# Patient Record
Sex: FEMALE | Race: WHITE | Employment: FULL TIME | URBAN - METROPOLITAN AREA
[De-identification: names, ages, dates, MRNs, and addresses within clinical notes are randomized per-mention and may not be internally consistent; named-entity substitution may affect disease eponyms.]

---

## 2017-01-05 ENCOUNTER — OFFICE VISIT (OUTPATIENT)
Dept: FAMILY MEDICINE CLINIC | Age: 32
End: 2017-01-05

## 2017-01-05 ENCOUNTER — TELEPHONE (OUTPATIENT)
Dept: FAMILY MEDICINE CLINIC | Age: 32
End: 2017-01-05

## 2017-01-05 VITALS
SYSTOLIC BLOOD PRESSURE: 122 MMHG | DIASTOLIC BLOOD PRESSURE: 70 MMHG | HEART RATE: 95 BPM | TEMPERATURE: 98.4 F | BODY MASS INDEX: 26.31 KG/M2 | OXYGEN SATURATION: 97 % | WEIGHT: 168 LBS

## 2017-01-05 DIAGNOSIS — S05.01XA CORNEAL ABRASION, RIGHT, INITIAL ENCOUNTER: ICD-10-CM

## 2017-01-05 DIAGNOSIS — S05.01XA CORNEAL ABRASION, RIGHT, INITIAL ENCOUNTER: Primary | ICD-10-CM

## 2017-01-05 PROCEDURE — 99213 OFFICE O/P EST LOW 20 MIN: CPT | Performed by: NURSE PRACTITIONER

## 2017-01-05 ASSESSMENT — ENCOUNTER SYMPTOMS
EYE PAIN: 1
PHOTOPHOBIA: 1
EYE REDNESS: 1

## 2017-10-11 ENCOUNTER — TELEPHONE (OUTPATIENT)
Dept: FAMILY MEDICINE CLINIC | Age: 32
End: 2017-10-11

## 2017-10-11 NOTE — TELEPHONE ENCOUNTER
Pls  Tdap. Just fyi, I have noticed that a few things are scanned in w/o getting entered into .   Thx.

## 2017-10-30 ENCOUNTER — OFFICE VISIT (OUTPATIENT)
Dept: FAMILY MEDICINE CLINIC | Age: 32
End: 2017-10-30

## 2017-10-30 VITALS
BODY MASS INDEX: 25.33 KG/M2 | DIASTOLIC BLOOD PRESSURE: 78 MMHG | HEART RATE: 93 BPM | HEIGHT: 66 IN | WEIGHT: 157.6 LBS | SYSTOLIC BLOOD PRESSURE: 100 MMHG | OXYGEN SATURATION: 98 %

## 2017-10-30 DIAGNOSIS — Z00.00 ANNUAL PHYSICAL EXAM: Primary | ICD-10-CM

## 2017-10-30 LAB
A/G RATIO: 1.4 (ref 1.1–2.2)
ALBUMIN SERPL-MCNC: 3.9 G/DL (ref 3.4–5)
ALP BLD-CCNC: 53 U/L (ref 40–129)
ALT SERPL-CCNC: 9 U/L (ref 10–40)
ANION GAP SERPL CALCULATED.3IONS-SCNC: 12 MMOL/L (ref 3–16)
AST SERPL-CCNC: 11 U/L (ref 15–37)
BILIRUB SERPL-MCNC: 0.4 MG/DL (ref 0–1)
BUN BLDV-MCNC: 10 MG/DL (ref 7–20)
CALCIUM SERPL-MCNC: 9.1 MG/DL (ref 8.3–10.6)
CHLORIDE BLD-SCNC: 103 MMOL/L (ref 99–110)
CHOLESTEROL, TOTAL: 140 MG/DL (ref 0–199)
CO2: 24 MMOL/L (ref 21–32)
CREAT SERPL-MCNC: 0.8 MG/DL (ref 0.6–1.1)
GFR AFRICAN AMERICAN: >60
GFR NON-AFRICAN AMERICAN: >60
GLOBULIN: 2.8 G/DL
GLUCOSE BLD-MCNC: 79 MG/DL (ref 70–99)
HCT VFR BLD CALC: 37.2 % (ref 36–48)
HDLC SERPL-MCNC: 59 MG/DL (ref 40–60)
HEMOGLOBIN: 12.3 G/DL (ref 12–16)
LDL CHOLESTEROL CALCULATED: 72 MG/DL
MCH RBC QN AUTO: 32.1 PG (ref 26–34)
MCHC RBC AUTO-ENTMCNC: 33.2 G/DL (ref 31–36)
MCV RBC AUTO: 96.8 FL (ref 80–100)
PDW BLD-RTO: 14.4 % (ref 12.4–15.4)
PLATELET # BLD: 271 K/UL (ref 135–450)
PMV BLD AUTO: 8.5 FL (ref 5–10.5)
POTASSIUM SERPL-SCNC: 4.6 MMOL/L (ref 3.5–5.1)
RBC # BLD: 3.84 M/UL (ref 4–5.2)
SODIUM BLD-SCNC: 139 MMOL/L (ref 136–145)
TOTAL PROTEIN: 6.7 G/DL (ref 6.4–8.2)
TRIGL SERPL-MCNC: 46 MG/DL (ref 0–150)
VLDLC SERPL CALC-MCNC: 9 MG/DL
WBC # BLD: 5.2 K/UL (ref 4–11)

## 2017-10-30 PROCEDURE — 36415 COLL VENOUS BLD VENIPUNCTURE: CPT | Performed by: FAMILY MEDICINE

## 2017-10-30 PROCEDURE — 99395 PREV VISIT EST AGE 18-39: CPT | Performed by: FAMILY MEDICINE

## 2017-10-30 NOTE — PROGRESS NOTES
Subjective:      Patient ID: Mariano Lucas is a 33 y.o. female. HPI  Chief Complaint   Patient presents with    Annual Exam   Has been feeling well other than uri x few days, getting better with Cold Ease and Mucinex. Has lost 11 lb in last 10 mo. Goes to [employer], does small group training classes. No cp, sob, abd pain. Nl bowel and bladder fx. Nl menses. Review of Systems   All other systems reviewed and are negative. Objective:   Physical Exam  /78 (Site: Right Arm, Position: Sitting, Cuff Size: Medium Adult)   Pulse 93   Ht 5' 6\" (1.676 m)   Wt 157 lb 9.6 oz (71.5 kg)   LMP 10/05/2017 (Exact Date)   SpO2 98%   BMI 25.44 kg/m²   HEENT nl, Neck supple, no lad or tmg  CV RRR, Lungs CTA  Abd soft, NT, no masses or hsm, BS nl  Ext with strong pedal pulses, no edema    Assessment:            Plan:      Brett Sloan was seen today for annual exam.    Diagnoses and all orders for this visit:    Annual physical exam  -     CBC  -     Comprehensive Metabolic Panel  -     Lipid Panel   Wt loss, routine exercise, healthy eating applauded.

## 2018-02-19 ENCOUNTER — OFFICE VISIT (OUTPATIENT)
Dept: FAMILY MEDICINE CLINIC | Age: 33
End: 2018-02-19

## 2018-02-19 VITALS
BODY MASS INDEX: 25.82 KG/M2 | DIASTOLIC BLOOD PRESSURE: 60 MMHG | HEART RATE: 97 BPM | WEIGHT: 160 LBS | SYSTOLIC BLOOD PRESSURE: 104 MMHG | OXYGEN SATURATION: 98 %

## 2018-02-19 DIAGNOSIS — M76.52 PATELLAR TENDINITIS OF BOTH KNEES: Primary | ICD-10-CM

## 2018-02-19 DIAGNOSIS — M76.51 PATELLAR TENDINITIS OF BOTH KNEES: Primary | ICD-10-CM

## 2018-02-19 PROCEDURE — 99213 OFFICE O/P EST LOW 20 MIN: CPT | Performed by: FAMILY MEDICINE

## 2018-02-19 RX ORDER — IBUPROFEN 600 MG/1
TABLET ORAL
Qty: 90 TABLET | Refills: 1 | Status: SHIPPED | OUTPATIENT
Start: 2018-02-19

## 2018-02-19 ASSESSMENT — ENCOUNTER SYMPTOMS: BACK PAIN: 0

## 2018-02-19 NOTE — PROGRESS NOTES
Subjective:      Patient ID: Glynda Lesch is a 33 y.o. female. HPI  Chief Complaint   Patient presents with    Knee Pain     bilateral.   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 Had been working out 3-4 times per wk. Was doing lunges, squats in small group training. No specific injury. Stopped work outs 2-3 mo ago b/c of b/l ant knee pain, L>R. Pain is noted at work as well, tolerates pain. Walking up steps causes ant knees to burn. Is on ibu 200 2 po tid. Also uses compression sleeve and ice. Review of Systems   Constitutional: Negative for activity change, fatigue, fever and unexpected weight change. Musculoskeletal: Negative for back pain, gait problem, joint swelling and myalgias. Objective:   Physical Exam  /60 (Site: Left Arm, Position: Sitting, Cuff Size: Medium Adult)   Pulse 97   Wt 160 lb (72.6 kg)   LMP 01/29/2018   SpO2 98%   BMI 25.82 kg/m²   B/l knees w/o swelling, redness or warmth  FROM b/l knees, no crepitus  Knee ligaments intact    Assessment:            Plan:      [facility] was seen today for knee pain. Diagnoses and all orders for this visit:    Patellar tendinitis of both knees  -     ibuprofen (ADVIL;MOTRIN) 600 MG tablet; 1 po tid prn pain. Take with food. -     Lucia Gonzalez MD (General)   Quad strengthening exercises with 5 lb ankle velcro wts reviewed. If not improving in 2 wks, pt will f/u with ortho.

## 2018-10-24 ENCOUNTER — OFFICE VISIT (OUTPATIENT)
Dept: FAMILY MEDICINE CLINIC | Age: 33
End: 2018-10-24
Payer: COMMERCIAL

## 2018-10-24 VITALS
SYSTOLIC BLOOD PRESSURE: 102 MMHG | DIASTOLIC BLOOD PRESSURE: 72 MMHG | TEMPERATURE: 97.9 F | BODY MASS INDEX: 25.88 KG/M2 | WEIGHT: 161 LBS | HEART RATE: 112 BPM | HEIGHT: 66 IN | OXYGEN SATURATION: 99 % | RESPIRATION RATE: 16 BRPM

## 2018-10-24 DIAGNOSIS — Z00.00 ANNUAL PHYSICAL EXAM: Primary | ICD-10-CM

## 2018-10-24 DIAGNOSIS — K52.9 AGE (ACUTE GASTROENTERITIS): ICD-10-CM

## 2018-10-24 PROCEDURE — 99395 PREV VISIT EST AGE 18-39: CPT | Performed by: FAMILY MEDICINE

## 2018-10-24 ASSESSMENT — ENCOUNTER SYMPTOMS
BLOOD IN STOOL: 0
EYES NEGATIVE: 1
CHEST TIGHTNESS: 0
COLOR CHANGE: 0
ABDOMINAL DISTENTION: 0
ABDOMINAL PAIN: 0
COUGH: 0
VOMITING: 0
CONSTIPATION: 0
WHEEZING: 0
SHORTNESS OF BREATH: 0
DIARRHEA: 0
NAUSEA: 0

## 2018-10-24 ASSESSMENT — PATIENT HEALTH QUESTIONNAIRE - PHQ9
SUM OF ALL RESPONSES TO PHQ QUESTIONS 1-9: 0
2. FEELING DOWN, DEPRESSED OR HOPELESS: 0
1. LITTLE INTEREST OR PLEASURE IN DOING THINGS: 0
SUM OF ALL RESPONSES TO PHQ QUESTIONS 1-9: 0
SUM OF ALL RESPONSES TO PHQ9 QUESTIONS 1 & 2: 0

## 2020-02-12 NOTE — PROGRESS NOTES
Patient: Cherelle Higginbotham is a 36 y. o.female who presents today with the following Chief Complaint(s):  Chief Complaint   Patient presents with    Anxiety     and depression, stress at work, anxiety attacks, heavy breathing         HPI: Pt conts to work at [employer], has felt anxious and depressed. 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 [employer] is not replacing workers as they leave. Has been having 1-2 panic attacks per wk at work in past 2 mo b/c her work load is excessive, struggles to get done all that is required per shift. Has prim insomnia, feels fatigued. Feels stuck, has worked at [employer] 11 yr. Has ADHD, took adderall in high school, stopped seeing dr after high school. Listens to music when closing, helps her to focus. Pt tearfully states that she struggled in high school, mom yelled at her and belittled her for poor grades. Pt isn't sure what other occupation she wants to study in the future, fears she will struggle to learn new skills. Current Outpatient Medications   Medication Sig Dispense Refill    buPROPion (WELLBUTRIN XL) 150 MG extended release tablet 1 po qam x 7 d, then 2 po q am 60 tablet 1    ibuprofen (ADVIL;MOTRIN) 600 MG tablet 1 po tid prn pain. Take with food. 90 tablet 1    fluticasone (FLONASE) 50 MCG/ACT nasal spray 2 sprays per nostril daily 1 Bottle 12    vitamin D (CHOLECALCIFEROL) 1000 UNIT TABS tablet Take 1,000 Units by mouth daily       No current facility-administered medications for this visit. Patient's past medical history,surgical history, family history, medications,  and allergies  were all reviewed and updated as appropriate today. Review of Systems   Constitutional: Positive for fatigue. Negative for activity change, appetite change and unexpected weight change. Psychiatric/Behavioral: Positive for dysphoric mood and sleep disturbance.  Negative for agitation, decreased concentration, self-injury and suicidal ideas. The patient is nervous/anxious. Physical Exam  Constitutional:       Appearance: Normal appearance. She is well-developed. HENT:      Head: Normocephalic and atraumatic. Right Ear: External ear normal.      Left Ear: External ear normal.      Mouth/Throat:      Mouth: Mucous membranes are moist.      Pharynx: Oropharynx is clear. Eyes:      General: No scleral icterus. Right eye: No discharge. Left eye: No discharge. Conjunctiva/sclera: Conjunctivae normal.   Pulmonary:      Effort: Pulmonary effort is normal.   Musculoskeletal: Normal range of motion. Right lower leg: No edema. Left lower leg: No edema. Skin:     General: Skin is warm and dry. Neurological:      General: No focal deficit present. Mental Status: She is alert and oriented to person, place, and time. Psychiatric:         Behavior: Behavior normal.         Thought Content: Thought content normal.         Judgment: Judgment normal.      Comments: Tearful, depressed           /76 (Site: Right Upper Arm, Position: Sitting, Cuff Size: Medium Adult)   Pulse 118   Resp 16   Ht 5' 6\" (1.676 m)   Wt 171 lb (77.6 kg)   LMP 02/13/2020 (Exact Date)   SpO2 100%   Breastfeeding No   BMI 27.60 kg/m²     Assessment/Plan:    Maico Simms was seen today for anxiety. Diagnoses and all orders for this visit:    Anxiety and depression  -     buPROPion (WELLBUTRIN XL) 150 MG extended release tablet; 1 po qam x 7 d, then 2 po q am, new rx   Refer to Dr Parveen Laboy    Attention deficit hyperactivity disorder (ADHD), predominantly inattentive type  -     buPROPion (WELLBUTRIN XL) 150 MG extended release tablet; 1 po qam x 7 d, then 2 po q am, new rx    Billing on basis of time. Pt was seen for 30 min, more than 50% spent in counseling. Billing on basis of time. Pt was seen for 30 min, more than 50% spent in counseling.       On the basis of positive PHQ-9 screening (PHQ-9 Total Score: 12), the

## 2020-02-13 ENCOUNTER — OFFICE VISIT (OUTPATIENT)
Dept: FAMILY MEDICINE CLINIC | Age: 35
End: 2020-02-13
Payer: COMMERCIAL

## 2020-02-13 VITALS
RESPIRATION RATE: 16 BRPM | BODY MASS INDEX: 27.48 KG/M2 | HEART RATE: 118 BPM | HEIGHT: 66 IN | OXYGEN SATURATION: 100 % | DIASTOLIC BLOOD PRESSURE: 76 MMHG | WEIGHT: 171 LBS | SYSTOLIC BLOOD PRESSURE: 122 MMHG

## 2020-02-13 PROCEDURE — 99214 OFFICE O/P EST MOD 30 MIN: CPT | Performed by: FAMILY MEDICINE

## 2020-02-13 PROCEDURE — G0444 DEPRESSION SCREEN ANNUAL: HCPCS | Performed by: FAMILY MEDICINE

## 2020-02-13 PROCEDURE — G8431 POS CLIN DEPRES SCRN F/U DOC: HCPCS | Performed by: FAMILY MEDICINE

## 2020-02-13 RX ORDER — BUPROPION HYDROCHLORIDE 150 MG/1
TABLET ORAL
Qty: 60 TABLET | Refills: 1 | Status: SHIPPED | OUTPATIENT
Start: 2020-02-13 | End: 2020-03-16

## 2020-02-13 ASSESSMENT — PATIENT HEALTH QUESTIONNAIRE - PHQ9
6. FEELING BAD ABOUT YOURSELF - OR THAT YOU ARE A FAILURE OR HAVE LET YOURSELF OR YOUR FAMILY DOWN: 2
4. FEELING TIRED OR HAVING LITTLE ENERGY: 2
1. LITTLE INTEREST OR PLEASURE IN DOING THINGS: 1
9. THOUGHTS THAT YOU WOULD BE BETTER OFF DEAD, OR OF HURTING YOURSELF: 1
8. MOVING OR SPEAKING SO SLOWLY THAT OTHER PEOPLE COULD HAVE NOTICED. OR THE OPPOSITE, BEING SO FIGETY OR RESTLESS THAT YOU HAVE BEEN MOVING AROUND A LOT MORE THAN USUAL: 2
SUM OF ALL RESPONSES TO PHQ9 QUESTIONS 1 & 2: 2
2. FEELING DOWN, DEPRESSED OR HOPELESS: 1
7. TROUBLE CONCENTRATING ON THINGS, SUCH AS READING THE NEWSPAPER OR WATCHING TELEVISION: 1
3. TROUBLE FALLING OR STAYING ASLEEP: 1
5. POOR APPETITE OR OVEREATING: 1
SUM OF ALL RESPONSES TO PHQ QUESTIONS 1-9: 12
SUM OF ALL RESPONSES TO PHQ QUESTIONS 1-9: 12

## 2020-02-24 ENCOUNTER — OFFICE VISIT (OUTPATIENT)
Dept: PSYCHOLOGY | Age: 35
End: 2020-02-24
Payer: COMMERCIAL

## 2020-02-24 PROCEDURE — 90791 PSYCH DIAGNOSTIC EVALUATION: CPT | Performed by: PSYCHOLOGIST

## 2020-02-24 ASSESSMENT — ANXIETY QUESTIONNAIRES
7. FEELING AFRAID AS IF SOMETHING AWFUL MIGHT HAPPEN: 0-NOT AT ALL
1. FEELING NERVOUS, ANXIOUS, OR ON EDGE: 0-NOT AT ALL
2. NOT BEING ABLE TO STOP OR CONTROL WORRYING: 1-SEVERAL DAYS
3. WORRYING TOO MUCH ABOUT DIFFERENT THINGS: 1-SEVERAL DAYS
4. TROUBLE RELAXING: 1-SEVERAL DAYS
5. BEING SO RESTLESS THAT IT IS HARD TO SIT STILL: 1-SEVERAL DAYS
GAD7 TOTAL SCORE: 4
6. BECOMING EASILY ANNOYED OR IRRITABLE: 0-NOT AT ALL

## 2020-02-24 ASSESSMENT — PATIENT HEALTH QUESTIONNAIRE - PHQ9
SUM OF ALL RESPONSES TO PHQ QUESTIONS 1-9: 2
1. LITTLE INTEREST OR PLEASURE IN DOING THINGS: 1
SUM OF ALL RESPONSES TO PHQ QUESTIONS 1-9: 2
2. FEELING DOWN, DEPRESSED OR HOPELESS: 1
SUM OF ALL RESPONSES TO PHQ9 QUESTIONS 1 & 2: 2

## 2020-02-24 NOTE — PROGRESS NOTES
Behavioral Health Consultation  Bibi Atkinson, Ph.D.  Psychologist  2/24/2020  10:35 AM      Time spent with Patient: 25 minutes  This is patient's first [facility] appointment. Reason for Consult:    Chief Complaint   Patient presents with    Anxiety    ADHD     Referring Provider: Alexis Kim MD  [address] [facility], [address] [facility]    Pt provided informed consent for the behavioral health program. Discussed with patient model of service to include the limits of confidentiality (i.e. abuse reporting, suicide intervention, etc.) and short-term intervention focused approach. Pt indicated understanding. Feedback given to PCP. S:  Patient presents with concerns about anxiety, feeling overwhelmed. Sx have been present for about a year, but are worse in the last few months. Pt reports symptoms of anxiety, including racing thoughts, irritability, poor concentration/focus, restlessness, insomnia and fatigue. Pt also reports increased tearfulness, shakiness, diaphoresis, dizziness, and decreased appetite. She feels down and has been experiencing anhedonia. Sx are aggravated by stress at work ([employer]). She feels overwhelmed by responsibilities at work, but upset that she has been asked to do the work of 3 people. Patient finds relief from current dose of Wellbutrin. Goals for treatment incude \"feeling stronger,\" increasing confidence, feeling happier. Patient noted that her mother was hard on her during high school. Patient was treated for ADHD and does acknowledge some difficulty concentrating. However, she notes feeling more affected by the way her mother treated her. Patient lives alone. Patient works full-time for [employer] in [employer]. Her hours vary. Daily routine is fairly consistent. Daily caffeine use includes 1-2 cups of coffee. No cigarette use, drinks alcohol once a week or less, no illegal drug use. Patient spends a few hours a day on social media or watching TV.  She currently exercises once a week (weights, running), but had been going 2-3 times a week. Patient enjoys the following hobbies: crafting, reading, watching movies. Patient describes difficulty initiating sleep. She feels tired even after a full night of sleep. Family history is unremarkable. O:  MSE:    Appearance    alert, cooperative  Appetite normal  Sleep disturbance Yes  Fatigue Yes  Loss of pleasure Yes  Impulsive behavior No  Speech    spontaneous, normal rate and normal volume  Mood    Anxious  Depressed   Affect    flat affect  Thought Content    intrusive thoughts and all or nothing thinking  Thought Process    linear, goal directed and coherent  Associations    logical connections  Insight    Good  Judgment    Intact  Orientation    oriented to person, place, time, and general circumstances  Memory    recent and remote memory intact  Attention/Concentration    impaired  Morbid ideation Yes  Suicide Assessment    suicidal ideation with unclear plan no intent    History:    Medications:   Current Outpatient Medications   Medication Sig Dispense Refill    buPROPion (WELLBUTRIN XL) 150 MG extended release tablet 1 po qam x 7 d, then 2 po q am 60 tablet 1    ibuprofen (ADVIL;MOTRIN) 600 MG tablet 1 po tid prn pain. Take with food. 90 tablet 1    fluticasone (FLONASE) 50 MCG/ACT nasal spray 2 sprays per nostril daily 1 Bottle 12    vitamin D (CHOLECALCIFEROL) 1000 UNIT TABS tablet Take 1,000 Units by mouth daily       No current facility-administered medications for this visit.       Social History:   Social History     Socioeconomic History    Marital status: Single     Spouse name: Not on file    Number of children: Not on file    Years of education: Not on file    Highest education level: Not on file   Occupational History    Not on file   Social Needs    Financial resource strain: Not on file    Food insecurity:     Worry: Not on file     Inability: Not on file    Transportation needs:     Medical: Not on file Non-medical: Not on file   Tobacco Use    Smoking status: Never Smoker    Smokeless tobacco: Never Used   Substance and Sexual Activity    Alcohol use: Yes     Comment: once a month    Drug use: No    Sexual activity: Not on file   Lifestyle    Physical activity:     Days per week: Not on file     Minutes per session: Not on file    Stress: Not on file   Relationships    Social connections:     Talks on phone: Not on file     Gets together: Not on file     Attends Mosque service: Not on file     Active member of club or organization: Not on file     Attends meetings of clubs or organizations: Not on file     Relationship status: Not on file    Intimate partner violence:     Fear of current or ex partner: Not on file     Emotionally abused: Not on file     Physically abused: Not on file     Forced sexual activity: Not on file   Other Topics Concern    Not on file   Social History Narrative    Not on file     TOBACCO:   reports that she has never smoked. She has never used smokeless tobacco.  ETOH:   reports current alcohol use. Family History:   Family History   Problem Relation Age of Onset    Diabetes Other         M Aunt    Cancer Other         M Uncle     A:  Administered PHQ-9 and NICKOLAS-7 (see below). Patient endorses minimal symptoms of depression and subclinical symptoms of anxiety. Endorses recent thoughts of suicide, including using a knife at work. She denies current intent to hurt herself and feels scared of these thoughts. Crisis hotlines provided. Insight and motivation are good.      PHQ Scores 2/24/2020 2/13/2020 10/24/2018   PHQ2 Score 2 2 0   PHQ9 Score 2 12 0     Interpretation of Total Score Depression Severity: 1-4 = Minimal depression, 5-9 = Mild depression, 10-14 = Moderate depression, 15-19 = Moderately severe depression, 20-27 = Severe depression    NICKOLAS 7 SCORE 2/24/2020   NICKOLAS-7 Total Score 4     Interpretation of NICKOLAS-7 score: 5-9 = mild anxiety, 10-14 = moderate anxiety, 15+ =

## 2020-02-24 NOTE — PATIENT INSTRUCTIONS
1. Review handouts about anxiety. The Physiology of Anxiety    When you sense danger, your brain activates your autonomic nervous system. The two branches of your autonomic nervous system, the sympathetic and the parasympathetic, control your body's energy level in order to prepare you for action. The sympathetic nervous system controls your fight or flight response and releases energy to prepare you for action. The parasympathetic nervous system is your bodys relaxation/recovery system:  it returns your body to a normal state when the danger is over. The sympathetic nervous system is an all-or-none system. That means that when its activated it quickly turns on all of its component parts (which is a great way for an emergency response system to operate):    Rapid Heart Rate, Rapid Breathing:  The alarm reaction increases the heart rate and breathing rate so that we are alert and our muscles are ready for action. These changes also help insure that the muscles and brain will have enough oxygen and energy for defense. At the same time, blood flow to the skin decreases, which prevents us from losing as much blood if we are wounded. Sweating:  Sweating helps to cool the body during exertion, making it more efficient. Particia Eugenio is what some people feel when sweating occurs at the same time that blood flow to the skin decreases. Tight Chest, Tingling, Numbness, Hot Flushes, Trembling:  Hyperventilation occurs when we breathe rapidly but do not expend the energy with muscle action, like revving a car while holding down the brake. This can lead to feelings of tingling and numbness, hot flushes, and increased sweating. When rapid chest breathing and muscle tension occur at the same time, people feel chest pain, breathlessness, and choking.       Upset Stomach, Diarrhea:  Digestion isnt needed during times of danger, and the sympathetic nervous system shuts it down, leading to dry mouth and an upset is dangerous and if you leave a situation because it illicits the fear response your brain convinces you that without leaving you would have never survived. If we never got back on the bike after we fell for the first time that would be our last memory of riding bikes and none of us would be riding bikes today. WHAT DO I DO NOW? The answer is simple, but the act is difficult. We have to go towards those things our brain tells us we must avoid. The goal of in-vivo exposure is to relearn through gradual exposure the followin. These uncomfortable feelings will decrease with time. I do not have to leave or avoid them all together as they will decrease. 2. There is nothing to be afraid of. You will see that the things you have been avoiding are not inherently dangerous, but you cannot learn that without exposure. 3. I can do it. No longer do your actions have to be dictated by anxiety or fear. Personal Thought  Control. Our thinking often creates anxiety for us. Getting better control of our thinking can go a long way in helping us cope. The following steps can be useful. 1. Let yourself become aware of thoughts you have when you are anxious. What are the words that you are saying to yourself at that moment? Sometimes it takes a little practice before we become aware of our thoughts. Some examples might be:  I know something bad is going to happen, or This is horrible or Cheron Re is this happening to me!?  2. Write your thoughts down. Its much easier to work with our thoughts, analyze them, and replace them if they are in black and white.   3. Ask yourself the following questions about your thoughts:  a. Is it true? (Is it logically correct? Where is the evidence to support the truth of that thought? Are there alternative ways of thinking that would be more correct?). If a thought is not as true as it could be, replace it with a more realistic and helpful one.   The majority of thoughts we have that generate anxiety are not the most realistic appraisals of the situation. b. So what? (If this is logically correct, what does it mean to me? Is there anything I can do about the situation? Is it in my best interest to get anxious about this?). 4. Use coping self-statements. When feeling anxious, you may be able to tell yourself automatic phrases without thinking too much about it. A couple of examples would be phrases such as Its OK, I can handle it, or Ive been through things like this before and have done all right.   Notice that these statements tend to be true for all of us. 5. Notice a change in your emotional state as you change your thinking. As your thoughts become more realistic, you will probably notice a decrease in anxiety and tension, and an increase in your ability to cope. The Stress Response and How It Can Affect You   The stress response, or fight or flight response is the emergency reaction system of the body. It is there to keep you safe in emergencies. The stress response includes physical and thought responses to your perception of various situations. When the stress response is turned on, your body may release substances like adrenaline and cortisol. Your organs are programmed to respond in certain ways to situations that are viewed as challenging or threatening. The stress response can work against you. You can turn it on when you dont really need it and, as a result, perceive something as an emergency when its really not. It can turn on when you are just thinking about past or future events. Harmless, chronic conditions can be intensified by the stress response activating too often, with too much intensity, or for too long. Stress responses can be different for different individuals. Below is a list of some common stress related responses people have.  ([place] the responses you have had in the last 2 weeks.)     Physical Responses   Muscle aches   Insomnia   ? Heart rate   Headache   Weight gain   Nausea   Constipation   Dry mouth   Muscle twitching  Weight loss   Low energy   Weakness   Tight chest   Diarrhea   Dizziness   Trembling   Stomach cramps  Chills    Hot flashes   Sweating   Pounding heart  Choking feeling  Chest pain   Leg cramps   Numb hands/feet Dry throat   Appetite change  Face flushing   ? Blood pressure  Light-headedness  Feeling faint       Troubleswallowing   Rash ? Urination   Neck pain     Tingling hands/feet     Emotional and Thought Responses   Restlessness   Agitation   Insecurity            Worthlessness   Anxiety   Stress    Depression            Hopelessness   Guilt    Defensiveness  Anger           Racing thoughts   Nightmares   Intense thinking  Sensitivity          Expecting the worst   Numbness   Lack of motivation  Mood swings             Forgetfulness   ? Concentration  Rigidity              Preoccupation  Intolerance     Behavioral Responses   Avoidance   Withdrawal   Neglect   ? Alcohol use    Smoking   ? Eating   Arguing       Poor appearance   ? Spending   Poor hygiene   ? Eating  Seeking reassurance   Nail biting   Skin picking   ? Talking        ? Body checking   Sexual problems  Foot tapping  Fidgeting Rapid walking    ? Exercise   Teeth clenching           Multitasking  Aggressive speaking       ? Fun activities  ? Sleeping      ? Relaxing activities     Seeking information     The parasympathetic nervous system in your body is designed to turn on your bodys relaxation response. Your behaviors and thinking can keep your bodys natural relaxation response from operating at its best.   Getting your body to relax on a daily basis for at least brief periods can help decrease unpleasant stress responses. Learning to relax your body, through specific breathing and relaxation exercises as well as by minimizing stressful thinking, can help your bodys natural relaxation system be more effective.

## 2020-03-16 ENCOUNTER — OFFICE VISIT (OUTPATIENT)
Dept: FAMILY MEDICINE CLINIC | Age: 35
End: 2020-03-16
Payer: COMMERCIAL

## 2020-03-16 ENCOUNTER — OFFICE VISIT (OUTPATIENT)
Dept: PSYCHOLOGY | Age: 35
End: 2020-03-16
Payer: COMMERCIAL

## 2020-03-16 VITALS
HEART RATE: 95 BPM | HEIGHT: 67 IN | WEIGHT: 171.6 LBS | BODY MASS INDEX: 26.93 KG/M2 | SYSTOLIC BLOOD PRESSURE: 130 MMHG | DIASTOLIC BLOOD PRESSURE: 80 MMHG | OXYGEN SATURATION: 98 %

## 2020-03-16 PROCEDURE — 90832 PSYTX W PT 30 MINUTES: CPT | Performed by: PSYCHOLOGIST

## 2020-03-16 PROCEDURE — 99213 OFFICE O/P EST LOW 20 MIN: CPT | Performed by: FAMILY MEDICINE

## 2020-03-16 RX ORDER — BUPROPION HYDROCHLORIDE 150 MG/1
TABLET ORAL
Qty: 60 TABLET | Refills: 3 | Status: CANCELLED | OUTPATIENT
Start: 2020-03-16

## 2020-03-16 RX ORDER — BUPROPION HYDROCHLORIDE 300 MG/1
300 TABLET ORAL EVERY MORNING
Qty: 30 TABLET | Refills: 5 | Status: SHIPPED | OUTPATIENT
Start: 2020-03-16

## 2020-03-16 NOTE — PROGRESS NOTES
Behavioral Health Consultation  Marcie Davison, Ph.D.  Psychologist  3/16/2020  9:37 AM EDT      Time spent with Patient: 20 minutes  This is patient's second  [facility] appointment. Reason for Consult:    Chief Complaint   Patient presents with    Depression    Anxiety     Referring Provider: Desirae Brizuela MD  [address], [address]    Feedback given to PCP. S:  Patient notes significant improvement in her mood since last visit, no panic attacks. Acknowledges benefit from medication. She works at [employer], so stress has been increased in the last week due to increased demands and current pandemic. Provided validation for her ability to manage this increased stress. She has received positive feedback about her attitude at work. She has been exercising more regularly. Describes a 50% improvement in her mood. Reviewed negative automatic thoughts and strategies for challenging them. O:  MSE:    Appearance    alert, cooperative  Appetite normal  Sleep disturbance No  Fatigue No  Loss of pleasure No  Impulsive behavior No  Speech    spontaneous, normal rate and normal volume  Mood    euthymic   Affect    normal affect  Thought Content    intrusive thoughts  Thought Process    linear, goal directed and coherent  Associations    logical connections  Insight    Good  Judgment    Intact  Orientation    oriented to person, place, time, and general circumstances  Memory    recent and remote memory intact  Attention/Concentration    intact  Morbid ideation No  Suicide Assessment    no suicidal ideation    History:    Medications:   Current Outpatient Medications   Medication Sig Dispense Refill    buPROPion (WELLBUTRIN XL) 150 MG extended release tablet 1 po qam x 7 d, then 2 po q am 60 tablet 1    ibuprofen (ADVIL;MOTRIN) 600 MG tablet 1 po tid prn pain. Take with food.  90 tablet 1    fluticasone (FLONASE) 50 MCG/ACT nasal spray 2 sprays per nostril daily 1 Bottle 12    vitamin D (CHOLECALCIFEROL) 1000 UNIT TABS tablet Take 1,000 Units by mouth daily       No current facility-administered medications for this visit. Social History:   Social History     Socioeconomic History    Marital status: Single     Spouse name: Not on file    Number of children: Not on file    Years of education: Not on file    Highest education level: Not on file   Occupational History    Not on file   Social Needs    Financial resource strain: Not on file    Food insecurity     Worry: Not on file     Inability: Not on file    Transportation needs     Medical: Not on file     Non-medical: Not on file   Tobacco Use    Smoking status: Never Smoker    Smokeless tobacco: Never Used   Substance and Sexual Activity    Alcohol use: Yes     Comment: once a month    Drug use: No    Sexual activity: Not on file   Lifestyle    Physical activity     Days per week: Not on file     Minutes per session: Not on file    Stress: Not on file   Relationships    Social connections     Talks on phone: Not on file     Gets together: Not on file     Attends Mu-ism service: Not on file     Active member of club or organization: Not on file     Attends meetings of clubs or organizations: Not on file     Relationship status: Not on file    Intimate partner violence     Fear of current or ex partner: Not on file     Emotionally abused: Not on file     Physically abused: Not on file     Forced sexual activity: Not on file   Other Topics Concern    Not on file   Social History Narrative    Not on file     TOBACCO:   reports that she has never smoked. She has never used smokeless tobacco.  ETOH:   reports current alcohol use. Family History:   Family History   Problem Relation Age of Onset    Diabetes Other         M Aunt    Cancer Other         M Uncle     A:  Patient engaged and cooperative. Denies SI. Insight and motivation are good. Diagnosis:    1. Depression, unspecified depression type    2.  Anxiety disorder, unspecified type Diagnosis Date    ADHD (attention deficit hyperactivity disorder)     on adderall in jr and sr high    Horseshoe kidney     UTI (urinary tract infection)     RECURRENT AS AN ADOLESCENT    Vitamin D deficiency      Plan:  Pt interventions:  Conducted functional assessment, [place]-setting to identify pt's primary goals for [facility] visit / overall health, Supportive techniques, Emphasized self-care as important for managing overall health, Cognitive strategies to target anxiety including cognitive restructuring and Identified maladaptive thoughts.     Pt Behavioral Change Plan:   See Pt Instructions

## 2020-03-16 NOTE — PATIENT INSTRUCTIONS
true? 14. Are there strengths in me or positives in the situation that I am ignoring? Am I underestimating my ability to cope with unfortunate circumstances? 15. When I am not feeling this way, do I think about this situation any differently? How?  16. Have I been in this type of situation before? What happened? What have I learned from prior experiences that could help me now? 17. Five years from now, if I look back on this situation, will I look at it any differently? Will I focus on any different part of my experience? 25. Am I blaming myself for something over which I do not have complete control? 1. General Questions to Challenge Alarming Thoughts. A.  Am I alarming myself unnecessarily? Can I see this another way?   B. What am I demanding must happen? What do I want rather than need? C. Am I rating something a catastrophe? Is it every bit that awful? D. Am I rating a type of person? What's the action I don't like?   E. What's untrue about my thoughts? How can I stick to the facts? 2.  Strategies to Change Alarming Evaluations. A.  Listen for the extreme or catastrophic rating words (horrible, terrible, disaster. awful) of an event, a rating which implies that things couldn't be worse and you will not be able to survive the event. B. Instead of using this extreme rating when it doesn't fully apply, think of the event in terms of degree of disappointment or inconvenience. Other words might better describe the relative severity of the event such as annoying, nuisance, irritating, unfortunate, unlucky, frustration, or problem. Selvin [place] for the extreme or overly general rating of a person (loser, stupid, inconsiderate, pushy, selfish, jerk, incompetent), something which implies that there are good and bad people in the world and this person definitely is part of the bad group. D.   Focus your judgment more on the specific action as the problem rather than what you believe is the general type of person involved. Realize that you are on shaky ground whenever you think you can fairly and without a doubt categorize someone as totally fitting a particular type. It is much more relevant to think in terms of the actions  which someone did that you disagree with or you see as mistakes. This pertains to your rating of your self as well as the self of another. Examples:     Alarming Evaluation: Maryuri Nguyễn, trying to stay on this diet is terrible; I can't take it anymore. Reassuring Evaluation: Not being able to eat exactly what I used to sure feels frustrating sometimes, but I can manage. Alarming Evaluation: He just doesn't know what the hell he's doing. Besides that, he doesn't give a damn about anybody but himself. Reassuring Evaluation: I think he's making a mistake here by not involving the rest of the staff in this decision. 3.  Strategies to Change Alarming Expectations. Jess Mahmoodzzle out the element of truth or the preference in your alarming expectation. B.  Remove the absolute demand words (must, should, need, have to) and replace them with words of preference (want to; would like; wish; it would be better if). C.  Check that your preference is reasonable considering the cost of it to your health, convenience, relationships, or your other priorities. Examples:     Alarming Expectation:  I must not ever make a mistake. Reassuring Expectation:  I want to do things well and reduce my mistakes. Alarming Expectation: You have to always treat me fairly. Reassuring Expectation: I would like you to do everything I think is right but realize you won't always see things my way. 4.  Strategies to Change Alarming Predictions. Jess Frizzle out what you see as the alarming scenario. Alberta Cadet yourself what actually are the odds of this entire scene taking place. If this is not really very likely. Remind yourself of the more probable events.    C.  Play out what

## 2020-04-13 RX ORDER — BUPROPION HYDROCHLORIDE 150 MG/1
TABLET ORAL
Qty: 53 TABLET | Refills: 0 | Status: SHIPPED | OUTPATIENT
Start: 2020-04-13

## 2020-04-15 ENCOUNTER — TELEMEDICINE (OUTPATIENT)
Dept: PSYCHOLOGY | Age: 35
End: 2020-04-15
Payer: COMMERCIAL

## 2020-04-15 PROCEDURE — 90832 PSYTX W PT 30 MINUTES: CPT | Performed by: PSYCHOLOGIST

## 2020-04-15 NOTE — PATIENT INSTRUCTIONS
1. Review the grounding strategies listed below. 2. Search YouTube for Progressive Muscle Relaxation. 3. Your next appointment with Dr. Jacob Herndon is in 4 weeks. Grounding  Grounding is a technique that helps keep you focused on the present moment by reorienting you to reality in the here-and-now. Grounding skills can be helpful in managing overwhelming feelings or intense anxiety. They help you to regain your mental focus from an often intensely emotional state. Grounding skills occur within two specific approaches:   Sensory Awareness and Cognitive Awareness    1. Sensory Awareness (awareness of senses)    Grounding Exercise #1:   Keep your eyes open, look around the room, notice your surroundings, notice  details.  Hold a pillow, stuffed animal or a ball.  Place a cool cloth or hold something cool (e.g., can of soda) on your forehead or the inside of your wrist   Listen to soothing music   Put your feet firmly on the ground   FOCUS on someones voice or a neutral conversation. Grounding Exercise #2:   The [id number] Exercise   Name 5 things you can see in the room with you.  Name 4 things you can feel Catarino Halsted on my back or feet on floor)   Name 3 things you can hear right now (fingers tapping on keyboard or tv)   Name 2 things you can smell right now (or, 2 things you like the smell of)   Name 1 good thing about yourself    Grounding Exercise #3  5/5/5 Grounding Exercise  What are 5 things you can see? What are 5 things you can feel? What are 5 things you can hear?    -Repeat with 4 different observations for each, 3 different for each, 2 different for each, etc.   -If you get to 1 and are still feeling anxious, re-start at 5 with 5 different things you can see. 2. Cognitive Awareness (awareness of thoughts)    Grounding Exercise #4: Re-orient yourself in place and time by asking yourself some or all of these questions:  1. Where am I?  2. What is today? 3. What is the date? 4.  What

## 2020-04-15 NOTE — PROGRESS NOTES
Parent     Provider location: [address]:  Patient reported that her mood has been stable since last visit. She has been able to identify her negative thinking patterns and challenge them, to some degree. Processed her concerns about the economy, changes to her lifestyle based on current pandemic. Discussed grounding exercises, relaxation training. Also processed changes in her relationships, difficulty opening up to other people. O:  MSE:    Appearance: good hygiene   Attitude: cooperative and friendly  Consciousness: alert  Orientation: oriented to person, place, time, general circumstance  Memory: recent and remote memory intact  Attention/Concentration: intact during session  Psychomotor Activity:normal  Eye Contact: normal  Speech: normal rate and volume, well-articulated  Mood: anxious  Affect: euthymic  Perception: within normal limits  Thought Content: all-or-none thinking and intrusive thoughts  Thought Process: logical, coherent and goal-directed  Insight: good  Judgment: intact  Ability to understand instructions: Yes  Ability to respond meaningfully: Yes  Morbid Ideation: no   Suicide Assessment: no suicidal ideation, plan, or intent  Homicidal Ideation: no    History:    Medications:   Current Outpatient Medications   Medication Sig Dispense Refill    buPROPion (WELLBUTRIN XL) 150 MG extended release tablet TAKE ONE TABLET BY MOUTH EVERY MORNING FOR 7 DAYS, THEN TAKE TWO TABLETS BY MOUTH EVERY MORNING 53 tablet 0    buPROPion (WELLBUTRIN XL) 300 MG extended release tablet Take 1 tablet by mouth every morning 30 tablet 5    ibuprofen (ADVIL;MOTRIN) 600 MG tablet 1 po tid prn pain. Take with food. 90 tablet 1    fluticasone (FLONASE) 50 MCG/ACT nasal spray 2 sprays per nostril daily 1 Bottle 12    vitamin D (CHOLECALCIFEROL) 1000 UNIT TABS tablet Take 1,000 Units by mouth daily       No current facility-administered medications for this visit.       Social History:   Social History

## 2020-05-13 ENCOUNTER — TELEMEDICINE (OUTPATIENT)
Dept: PSYCHOLOGY | Age: 35
End: 2020-05-13
Payer: COMMERCIAL

## 2020-05-13 PROCEDURE — 90832 PSYTX W PT 30 MINUTES: CPT | Performed by: PSYCHOLOGIST

## 2020-05-13 NOTE — PROGRESS NOTES
Parent     Provider location: [address]:  Patient reported that she has been more aware of her negative thinking patterns, is trying to challenge those thoughts when possible. She has also been using grounding techniques with some success (change in mood, improvement in physiological symptoms). Challenged black and white thinking about going back to school: \"Maybe I'm just stuck in my ways. \" Discussed recent evidence of adaptability. Processed her concerns about focusing on coursework, completing tasks in a reasonable amount of time. Reviewed strategies that were helpful to her in the past. Also processed her sense of self as a \"quitter\" for withdrawing from college in the past. Reviewed and recommended self-compassion exercise. O:  MSE:    Appearance: good hygiene   Attitude: cooperative and friendly  Consciousness: alert  Orientation: oriented to person, place, time, general circumstance  Memory: recent and remote memory intact  Attention/Concentration: intact during session  Psychomotor Activity:normal  Eye Contact: normal  Speech: normal rate and volume, well-articulated  Mood: anxious  Affect: flat  Perception: within normal limits  Thought Content: all-or-none thinking and intrusive thoughts  Thought Process: logical, coherent and goal-directed  Insight: good  Judgment: intact  Ability to understand instructions: Yes  Ability to respond meaningfully: Yes  Morbid Ideation: no   Suicide Assessment: no suicidal ideation, plan, or intent  Homicidal Ideation: no    History:    Medications:   Current Outpatient Medications   Medication Sig Dispense Refill    buPROPion (WELLBUTRIN XL) 150 MG extended release tablet TAKE ONE TABLET BY MOUTH EVERY MORNING FOR 7 DAYS, THEN TAKE TWO TABLETS BY MOUTH EVERY MORNING 53 tablet 0    buPROPion (WELLBUTRIN XL) 300 MG extended release tablet Take 1 tablet by mouth every morning 30 tablet 5    ibuprofen (ADVIL;MOTRIN) 600 MG tablet 1 po tid prn pain.  Take with food. 90 tablet 1    fluticasone (FLONASE) 50 MCG/ACT nasal spray 2 sprays per nostril daily 1 Bottle 12    vitamin D (CHOLECALCIFEROL) 1000 UNIT TABS tablet Take 1,000 Units by mouth daily       No current facility-administered medications for this visit. Social History:   Social History     Socioeconomic History    Marital status: Single     Spouse name: Not on file    Number of children: Not on file    Years of education: Not on file    Highest education level: Not on file   Occupational History    Not on file   Social Needs    Financial resource strain: Not on file    Food insecurity     Worry: Not on file     Inability: Not on file    Transportation needs     Medical: Not on file     Non-medical: Not on file   Tobacco Use    Smoking status: Never Smoker    Smokeless tobacco: Never Used   Substance and Sexual Activity    Alcohol use: Yes     Comment: once a month    Drug use: No    Sexual activity: Not on file   Lifestyle    Physical activity     Days per week: Not on file     Minutes per session: Not on file    Stress: Not on file   Relationships    Social connections     Talks on phone: Not on file     Gets together: Not on file     Attends Nondenominational service: Not on file     Active member of club or organization: Not on file     Attends meetings of clubs or organizations: Not on file     Relationship status: Not on file    Intimate partner violence     Fear of current or ex partner: Not on file     Emotionally abused: Not on file     Physically abused: Not on file     Forced sexual activity: Not on file   Other Topics Concern    Not on file   Social History Narrative    Not on file     TOBACCO:   reports that she has never smoked. She has never used smokeless tobacco.  ETOH:   reports current alcohol use. Family History:   Family History   Problem Relation Age of Onset    Diabetes Other         M Aunt    Cancer Other         M Uncle     A:  Patient engaged and cooperative.

## 2020-06-10 ENCOUNTER — TELEMEDICINE (OUTPATIENT)
Dept: PSYCHOLOGY | Age: 35
End: 2020-06-10
Payer: COMMERCIAL

## 2020-06-10 PROCEDURE — 90832 PSYTX W PT 30 MINUTES: CPT | Performed by: PSYCHOLOGIST

## 2021-12-24 ENCOUNTER — PATIENT MESSAGE (OUTPATIENT)
Dept: FAMILY MEDICINE CLINIC | Age: 36
End: 2021-12-24

## 2021-12-29 NOTE — TELEPHONE ENCOUNTER
From: Von Medina  To: Dr. Alexandro Carr: 12/24/2021 6:34 PM EST  Subject: Non-Urgent Medical Question    Hello, I just wanted to inform you that I started with a stuffy nose and 99.5 temperature this morning 12-24. I was able to get an at home test kit and tested positive for covid.

## 2024-02-04 ASSESSMENT — PATIENT HEALTH QUESTIONNAIRE - PHQ9
1. LITTLE INTEREST OR PLEASURE IN DOING THINGS: 1
1. LITTLE INTEREST OR PLEASURE IN DOING THINGS: SEVERAL DAYS
2. FEELING DOWN, DEPRESSED OR HOPELESS: 0
SUM OF ALL RESPONSES TO PHQ9 QUESTIONS 1 & 2: 1
SUM OF ALL RESPONSES TO PHQ QUESTIONS 1-9: 1
SUM OF ALL RESPONSES TO PHQ9 QUESTIONS 1 & 2: 1
2. FEELING DOWN, DEPRESSED OR HOPELESS: NOT AT ALL
SUM OF ALL RESPONSES TO PHQ QUESTIONS 1-9: 1

## 2024-02-06 NOTE — PROGRESS NOTES
Assessment/Plan:    Dariana was seen today for annual exam and palpitations.    Diagnoses and all orders for this visit:    Annual physical exam  -     Lipid Panel; Future  -     Comprehensive Metabolic Panel; Future    Increased heart rate  -     EKG 12 Lead NSR 82  -     Comprehensive Metabolic Panel; Future  -     TSH with Reflex to FT4; Future  -     CBC with Auto Differential; Future   If labs unrevealing, perhaps very mild tachycardia is 2/2 deconditioning. Pt has been exercising, losing wt in recent wks and reports fewer notifications in recent wks. If labs neg, will follow until next appt 4 mo. Consider holter then if alerts continue.    Non-seasonal allergic rhinitis due to other allergic trigger  -     fluticasone (FLONASE) 50 MCG/ACT nasal spray; 2 sprays by Each Nostril route daily. Pt had been buying otc but rx is given for possible cost control.    Need for hepatitis C screening test  -     Hepatitis C Antibody; Future    Lipid screening  -     Lipid Panel; Future        There are no Patient Instructions on file for this visit.       Patient: Dariana Banks is a 38 y.o.female who presents today with the following Chief Complaint(s):  Chief Complaint   Patient presents with    Annual Exam    Palpitations     Heart rate increases to no more than 110 and has been for past yr.          HPI: Pt with anxiety, ADHD tx'd in school, [employer] [place] has been working at [employer] since 8/2023, enjoys job.     Has been monitoring intake x 1 mo, decreasing portion control. Wt is down 7 lb from last weight 2020.    Has had 88 notifications in past 6 mo of notification of pulse>100 for 10 or more min asso'd with inactivity. Max pulse 110. Not dizzy, cp, sob during episodes. At other times, pt has had rare fleeting palp x few sec. Mom has PACs. Has had watch since 12/2022. Has minimal anxiety, controlled.    Notifications in recent wks have been less frequent, pt unsure why.    Pt uses treadmill and wt lifts biw.

## 2024-02-07 ENCOUNTER — OFFICE VISIT (OUTPATIENT)
Dept: FAMILY MEDICINE CLINIC | Age: 39
End: 2024-02-07
Payer: COMMERCIAL

## 2024-02-07 VITALS
SYSTOLIC BLOOD PRESSURE: 110 MMHG | BODY MASS INDEX: 25.74 KG/M2 | HEIGHT: 67 IN | HEART RATE: 113 BPM | DIASTOLIC BLOOD PRESSURE: 80 MMHG | OXYGEN SATURATION: 98 % | WEIGHT: 164 LBS

## 2024-02-07 DIAGNOSIS — Z13.220 LIPID SCREENING: ICD-10-CM

## 2024-02-07 DIAGNOSIS — Z11.59 NEED FOR HEPATITIS C SCREENING TEST: ICD-10-CM

## 2024-02-07 DIAGNOSIS — J30.89 NON-SEASONAL ALLERGIC RHINITIS DUE TO OTHER ALLERGIC TRIGGER: ICD-10-CM

## 2024-02-07 DIAGNOSIS — Z00.00 ANNUAL PHYSICAL EXAM: Primary | ICD-10-CM

## 2024-02-07 DIAGNOSIS — R00.0 INCREASED HEART RATE: ICD-10-CM

## 2024-02-07 PROCEDURE — 93000 ELECTROCARDIOGRAM COMPLETE: CPT | Performed by: FAMILY MEDICINE

## 2024-02-07 PROCEDURE — 99213 OFFICE O/P EST LOW 20 MIN: CPT | Performed by: FAMILY MEDICINE

## 2024-02-07 PROCEDURE — 1036F TOBACCO NON-USER: CPT | Performed by: FAMILY MEDICINE

## 2024-02-07 PROCEDURE — G8419 CALC BMI OUT NRM PARAM NOF/U: HCPCS | Performed by: FAMILY MEDICINE

## 2024-02-07 PROCEDURE — G8484 FLU IMMUNIZE NO ADMIN: HCPCS | Performed by: FAMILY MEDICINE

## 2024-02-07 PROCEDURE — G8427 DOCREV CUR MEDS BY ELIG CLIN: HCPCS | Performed by: FAMILY MEDICINE

## 2024-02-07 PROCEDURE — 99395 PREV VISIT EST AGE 18-39: CPT | Performed by: FAMILY MEDICINE

## 2024-02-07 RX ORDER — FLUTICASONE PROPIONATE 50 MCG
2 SPRAY, SUSPENSION (ML) NASAL DAILY
Qty: 16 G | Refills: 12 | Status: SHIPPED | OUTPATIENT
Start: 2024-02-07